# Patient Record
Sex: FEMALE | Race: WHITE | NOT HISPANIC OR LATINO | Employment: FULL TIME | URBAN - METROPOLITAN AREA
[De-identification: names, ages, dates, MRNs, and addresses within clinical notes are randomized per-mention and may not be internally consistent; named-entity substitution may affect disease eponyms.]

---

## 2017-02-09 ENCOUNTER — TELEPHONE (OUTPATIENT)
Dept: OBSTETRICS AND GYNECOLOGY | Facility: CLINIC | Age: 27
End: 2017-02-09

## 2017-02-09 NOTE — TELEPHONE ENCOUNTER
----- Message from Deneen Bowie sent at 2/9/2017  1:52 PM EST -----  Regarding: Etta Grey  Contact: [phone]  Pt was seen by Dr. Tovar, next appt scheduled with Dr. Reveles.   La prescribed her Imiquimod, pt states she is having redness, swelling, burning, itching and slight pain.  She is questioning if those are common side effects, if she should continue medication or discontinue.  I told her I would ask a nurse and follow up with her.      Please AdviseASTRID

## 2017-02-10 ENCOUNTER — TELEPHONE (OUTPATIENT)
Dept: OBSTETRICS AND GYNECOLOGY | Facility: CLINIC | Age: 27
End: 2017-02-10

## 2017-02-10 NOTE — TELEPHONE ENCOUNTER
Note reviewed.      Tissue irritation is very common with aldara.   Stop using product for 5 days then resume treatment as instruction originally.

## 2017-02-10 NOTE — TELEPHONE ENCOUNTER
----- Message from Deneen Bowie sent at 2/9/2017  1:52 PM EST -----  Regarding: Etta Grey  Contact: [phone]  Pt was seen by Dr. Tovar, next appt scheduled with Dr. Reveles.   La prescribed her Imiquimod, pt states she is having redness, swelling, burning, itching and slight pain.  She is questioning if those are common side effects, if she should continue medication or discontinue.  I told her I would ask a nurse and follow up with her.      Please AdviseASTRID

## 2017-10-24 ENCOUNTER — OFFICE VISIT (OUTPATIENT)
Dept: OBSTETRICS AND GYNECOLOGY | Facility: CLINIC | Age: 27
End: 2017-10-24

## 2017-10-24 VITALS
WEIGHT: 121 LBS | HEIGHT: 61 IN | BODY MASS INDEX: 22.84 KG/M2 | DIASTOLIC BLOOD PRESSURE: 70 MMHG | SYSTOLIC BLOOD PRESSURE: 118 MMHG

## 2017-10-24 DIAGNOSIS — R87.613 HGSIL (HIGH GRADE SQUAMOUS INTRAEPITHELIAL LESION) ON PAP SMEAR OF CERVIX: ICD-10-CM

## 2017-10-24 DIAGNOSIS — Z13.89 SCREENING FOR BLOOD OR PROTEIN IN URINE: Primary | ICD-10-CM

## 2017-10-24 DIAGNOSIS — Z11.3 SCREEN FOR STD (SEXUALLY TRANSMITTED DISEASE): ICD-10-CM

## 2017-10-24 DIAGNOSIS — Z13.9 SPECIAL SCREENING: ICD-10-CM

## 2017-10-24 LAB
B-HCG UR QL: NEGATIVE
INTERNAL NEGATIVE CONTROL: NEGATIVE
INTERNAL POSITIVE CONTROL: POSITIVE
Lab: NORMAL

## 2017-10-24 PROCEDURE — 57455 BIOPSY OF CERVIX W/SCOPE: CPT | Performed by: OBSTETRICS & GYNECOLOGY

## 2017-10-24 PROCEDURE — 81025 URINE PREGNANCY TEST: CPT | Performed by: OBSTETRICS & GYNECOLOGY

## 2017-10-24 NOTE — PROGRESS NOTES
[facility]    10/24/2017    Name: Etta Grey    MR#:[id number]      Procedures       Physical Exam    Colposcopy Procedure Note    Indications: Pap smear 10 months ago showed: high-grade squamous intraepithelial neoplasia  (HGSIL-encompassing moderate and severe dysplasia).  The patient had subsequent LEEP procedure that showed focal ESPERANZA-2 and ESPERANZA-3.  Due to the nature of the receipt of specimen clear margins could not be identified.  The patient returns today without having had previous scheduled follow-up        Procedure Details   The risks and benefits of the procedure and Written informed consent obtained.    Speculum placed in vagina and excellent visualization of cervix achieved, cervix swabbed x 3 with acetic acid solution.    Findings:  Cervix: acetowhite lesion(s) noted at 6 o'clock; appears more consistent with metaplasia.  Attempted biopsy and patient unable to tolerate.  Vaginal inspection: vaginal colposcopy not performed.  Vulvar colposcopy: vulvar colposcopy not performed.    Specimens: PAP, fragment biopsy at 6    Complications: none.    Plan:  Will base further treatment on Pathology findings.        10/24/2017  MD Jake Santos MD  10/24/2017  11:34 AM

## 2017-10-26 LAB
C TRACH RRNA SPEC QL NAA+PROBE: NEGATIVE
DX ICD CODE: NORMAL
DX ICD CODE: NORMAL
N GONORRHOEA RRNA SPEC QL NAA+PROBE: NEGATIVE
PATH REPORT.FINAL DX SPEC: NORMAL
PATH REPORT.GROSS SPEC: NORMAL
PATH REPORT.SITE OF ORIGIN SPEC: NORMAL
PATHOLOGIST NAME: NORMAL
PAYMENT PROCEDURE: NORMAL
T VAGINALIS RRNA SPEC QL NAA+PROBE: NEGATIVE

## 2017-10-27 ENCOUNTER — TELEPHONE (OUTPATIENT)
Dept: OBSTETRICS AND GYNECOLOGY | Facility: CLINIC | Age: 27
End: 2017-10-27

## 2017-10-27 LAB
CONV .: NORMAL
CYTOLOGIST CVX/VAG CYTO: NORMAL
CYTOLOGY CVX/VAG DOC THIN PREP: NORMAL
DX ICD CODE: NORMAL
HIV 1 & 2 AB SER-IMP: NORMAL
Lab: NORMAL
OTHER STN SPEC: NORMAL
PATH REPORT.FINAL DX SPEC: NORMAL
STAT OF ADQ CVX/VAG CYTO-IMP: NORMAL

## 2017-10-27 NOTE — TELEPHONE ENCOUNTER
This pt has called several times about her results . She seems like she doesn't understand . She wanted hpv results but looks like it could not be ran on the pap ? Not enough sells . Can you call  Her and ease her mind. [phone] .She has looked at every thing on my chart

## 2017-10-27 NOTE — TELEPHONE ENCOUNTER
----- Message from Jake Reveles MD sent at 10/26/2017  1:38 PM EDT -----  Call patient: Cervical biopsy returned mild dysplasia.  Follow-up 6 months

## 2017-10-28 ENCOUNTER — DOCUMENTATION (OUTPATIENT)
Dept: OBSTETRICS AND GYNECOLOGY | Facility: CLINIC | Age: 27
End: 2017-10-28

## 2017-10-28 PROBLEM — N87.0 DYSPLASIA OF CERVIX, LOW GRADE (CIN 1): Status: ACTIVE | Noted: 2017-10-28

## 2017-10-28 NOTE — TELEPHONE ENCOUNTER
Attempted call on both numbers ending 69 no answer and no VM.    No answer.  VM with no identifier - no message left    Phone number is the message DOES NOT match phone number in the chart.    Clarify please.

## 2017-10-28 NOTE — PROGRESS NOTES
"[facility]     10/28/2017      Patient:  Etta Grey   MR#:[id number]    Chart note    Received message from medical assistant that patient is confused by the results provided.    Patient was seen 10/24/17 for colposcopy.  PAP was done.  A small area on the cervix was visualized with the microscopy around the 6:00 position on the cervix that appears questionable for dysplasia.  That biopsy returned ESPERANZA I (mild dysplasia)    So to explain:  The PAP smear is collecting cells that are being shed by the cervix to look for disease.  Sometimes it is negative when disease is present because the disease is not big enough to shed cells.  The purpose of the colpo (microscope) is to visualize abnormalities and target a directed biopsy.  The biopsy performed shows \"mildly\" abnormal cells.    The patient will need a recheck in 6 months.  Most patients (>85%) will see resolution of mild dysplasia in 1 year with just observation.  Smoking cessation/smoking abstinence helps immensely.     If this does NOT fully explain things, please call Monday.    Jake Reveles MD.  10/28/2017  9:41 AM          "

## 2017-10-30 NOTE — TELEPHONE ENCOUNTER
Pt returned call and I informed her that her pap came back negative so the HPV was not ran. I explained to pt that this was good and that she just needs to come back in 6 months for F/U. Pt stated understanding

## 2018-01-29 ENCOUNTER — OFFICE VISIT (OUTPATIENT)
Dept: OBSTETRICS AND GYNECOLOGY | Age: 28
End: 2018-01-29

## 2018-01-29 VITALS
SYSTOLIC BLOOD PRESSURE: 90 MMHG | WEIGHT: 122 LBS | BODY MASS INDEX: 23.03 KG/M2 | HEIGHT: 61 IN | DIASTOLIC BLOOD PRESSURE: 60 MMHG

## 2018-01-29 DIAGNOSIS — Z13.89 SCREENING FOR BLOOD OR PROTEIN IN URINE: ICD-10-CM

## 2018-01-29 DIAGNOSIS — R30.0 DYSURIA: Primary | ICD-10-CM

## 2018-01-29 DIAGNOSIS — N30.10 INTERSTITIAL CYSTITIS (CHRONIC) WITHOUT HEMATURIA: ICD-10-CM

## 2018-01-29 DIAGNOSIS — Z11.3 SCREENING FOR STD (SEXUALLY TRANSMITTED DISEASE): ICD-10-CM

## 2018-01-29 LAB
BILIRUB BLD-MCNC: NEGATIVE MG/DL
CLARITY, POC: CLEAR
COLOR UR: YELLOW
GLUCOSE UR STRIP-MCNC: NEGATIVE MG/DL
KETONES UR QL: NEGATIVE
LEUKOCYTE EST, POC: NEGATIVE
NITRITE UR-MCNC: NEGATIVE MG/ML
PH UR: 6.5 [PH] (ref 5–8)
PROT UR STRIP-MCNC: NEGATIVE MG/DL
RBC # UR STRIP: NEGATIVE /UL
SP GR UR: 1.01 (ref 1–1.03)
UROBILINOGEN UR QL: NORMAL

## 2018-01-29 PROCEDURE — 99213 OFFICE O/P EST LOW 20 MIN: CPT | Performed by: OBSTETRICS & GYNECOLOGY

## 2018-01-29 PROCEDURE — 81002 URINALYSIS NONAUTO W/O SCOPE: CPT | Performed by: OBSTETRICS & GYNECOLOGY

## 2018-01-29 RX ORDER — ERGOCALCIFEROL 1.25 MG/1
CAPSULE ORAL
Refills: 0 | COMMUNITY
Start: 2018-01-09 | End: 2018-11-29

## 2018-01-29 RX ORDER — LEVOTHYROXINE SODIUM 0.05 MG/1
TABLET ORAL
Refills: 0 | COMMUNITY
Start: 2018-01-09 | End: 2021-08-04

## 2018-01-29 NOTE — PROGRESS NOTES
2018      Patient:  Etta Grey   MR#:[id number]    Office note    CC: urinary discomfort     Subjective     History of Present Illness  27 y.o. female  complaint of ongoing intermittent mild to moderate urinary discomfort perceived mostly as urgency are incomplete voiding.  She states the symptoms she equates to feeling like she has a bladder infection.  Outside of this visit she's had a couple of visits at urgent care facilities and had normal UAs at the time of her symptoms.  The patient states that episodes last several hours and may occur as frequently as 3 times a week are as infrequently as once a month.    We discussed multiple etiologies could be responsible for her symptoms.  Previous screening was essentially within normal limits.      Patient Active Problem List   Diagnosis   • High grade intrepith lesion cyto smr crvx (HGSIL)   • Dysplasia of cervix, low grade (ESPERANZA 1)       Past Medical History:   Diagnosis Date   • Cancer     CERVICAL   • Disease of thyroid gland 2018    hyperthyroid   • HGSIL (high grade squamous intraepithelial lesion) on Pap smear of cervix    • HPV (human papilloma virus) anogenital infection        Past Surgical History:   Procedure Laterality Date   • CERVICAL CONIZATION N/A 12/15/2016    Procedure: COLD KNIFE CERVICAL CONIZATION;  Surgeon: Leslye Tovar MD;  Location: [facility];  Service:    • COLPOSCOPY  2015    High Grade ESPERANZA II       Obstetric History:  OB History      Para Term  AB Living    0 0 0 0 0 0    SAB TAB Ectopic Multiple Live Births    0 0 0 0          Menstrual History:     Patient's last menstrual period was 2018 (lmp unknown).       No OB History data found    Family History   Problem Relation Age of Onset   • Diabetes Maternal Aunt    • Diabetes Maternal Uncle    • Diabetes Paternal Aunt    • Diabetes Paternal Uncle    • Ovarian cancer Maternal Grandmother 50   • Diabetes Maternal Grandfather   "  • Diabetes Paternal Grandfather    • Breast cancer Neg Hx    • Colon cancer Neg Hx    • Melanoma Neg Hx    • Prostate cancer Neg Hx    • Uterine cancer Neg Hx        Social History   Substance Use Topics   • Smoking status: Former Smoker     Packs/day: 0.25     Quit date: 11/6/2016   • Smokeless tobacco: Never Used   • Alcohol use Yes      Comment: 6 BEERS ONCE A MONTH        Review of patient's allergies indicates no known allergies.      Current Outpatient Prescriptions:   •  levothyroxine (SYNTHROID, LEVOTHROID) 50 MCG tablet, take 1 tablet by mouth every morning ON AN EMPTY STOMACH, Disp: , Rfl: 0  •  vitamin D (ERGOCALCIFEROL) 40474 units capsule capsule, take 1 capsule by mouth every week, Disp: , Rfl: 0    The following portions of the patient's history were reviewed and updated as appropriate: allergies, current medications, past family history, past medical history, past social history, past surgical history and problem list.    Review of Systems   Constitutional: Negative.    Respiratory: Negative.    Cardiovascular: Negative.    Gastrointestinal: Negative.    Genitourinary: Positive for dysuria and pelvic pain.   Psychiatric/Behavioral: Negative.        BP Readings from Last 3 Encounters:   01/29/18 90/60   10/24/17 118/70   12/27/16 118/72      Wt Readings from Last 3 Encounters:   01/29/18 55.3 kg (122 lb)   10/24/17 54.9 kg (121 lb)   12/27/16 50.8 kg (112 lb)      BMI: Estimated body mass index is 23.05 kg/(m^2) as calculated from the following:    Height as of this encounter: 154.9 cm (61\").    Weight as of this encounter: 55.3 kg (122 lb).  BSA: Estimated body surface area is 1.53 meters squared as calculated from the following:    Height as of this encounter: 154.9 cm (61\").    Weight as of this encounter: 55.3 kg (122 lb).    Objective   Physical Exam   Constitutional: She is oriented to person, place, and time. She appears well-developed and well-nourished.   HENT:   Head: Normocephalic and " atraumatic.   Cardiovascular: Normal rate.    Pulmonary/Chest: Effort normal.   Abdominal: Soft. Bowel sounds are normal. She exhibits no distension. There is no tenderness.   Genitourinary: Vagina normal and uterus normal.   Neurological: She is alert and oriented to person, place, and time.   Skin: Skin is warm and dry.   Psychiatric: She has a normal mood and affect. Her behavior is normal. Judgment and thought content normal.   Nursing note and vitals reviewed.        Assessment/Plan     Etta was seen today for follow-up.    Diagnoses and all orders for this visit:    Dysuria  -     Urine Culture - Urine, Urine, Clean Catch    Screening for blood or protein in urine  -     POC Urinalysis Dipstick  -     Urine Culture - Urine, Urine, Clean Catch    Screening for STD (sexually transmitted disease)  -     Chlamydia trachomatis, Neisseria gonorrhoeae, Trichomonas vaginalis, PCR - Swab, Vagina  -     Urine Culture - Urine, Urine, Clean Catch    Interstitial cystitis (chronic) without hematuria  -     Urine Culture - Urine, Urine, Clean Catch        -     pentosan polysulfate (ELMIRON) 100 MG capsule; Take 1 capsule by mouth 3 (Three) Times a Day Before Meals.        -     provided internet resources for reading about interstitial cystitis and alternative treatments      No Follow-up on file.        Jake Reveles MD   1/29/2018 1:17 PM

## 2018-01-31 LAB
BACTERIA UR CULT: NORMAL
BACTERIA UR CULT: NORMAL
C TRACH RRNA SPEC QL NAA+PROBE: NEGATIVE
N GONORRHOEA RRNA SPEC QL NAA+PROBE: NEGATIVE
T VAGINALIS RRNA SPEC QL NAA+PROBE: NEGATIVE

## 2018-02-01 ENCOUNTER — PROCEDURE VISIT (OUTPATIENT)
Dept: OBSTETRICS AND GYNECOLOGY | Age: 28
End: 2018-02-01

## 2018-02-01 DIAGNOSIS — R87.613 HIGH GRADE INTREPITH LESION CYTO SMR CRVX (HGSIL): ICD-10-CM

## 2018-02-01 DIAGNOSIS — R10.2 PELVIC PRESSURE IN FEMALE: ICD-10-CM

## 2018-02-01 DIAGNOSIS — N87.0 DYSPLASIA OF CERVIX, LOW GRADE (CIN 1): ICD-10-CM

## 2018-02-01 DIAGNOSIS — N39.0 FREQUENT UTI: ICD-10-CM

## 2018-02-01 PROCEDURE — 76830 TRANSVAGINAL US NON-OB: CPT | Performed by: OBSTETRICS & GYNECOLOGY

## 2018-02-01 NOTE — PROGRESS NOTES
Ultrasound Note     18    Patient:  Etta Grey      MR#:[id number]    27 y.o.   for GYN US    Patient Active Problem List   Diagnosis   • High grade intrepith lesion cyto smr crvx (HGSIL)   • Dysplasia of cervix, low grade (ESPERANZA 1)       [See the scanned report in the media tab for more details]    Impression    1.  Normal size uterus: 5.7 x 2.8 x 3.4  2.  Endometrium: 9.5 normal  3:  Myometrium: Unremarkable  4.  Ovaries Left: Multiple follicles otherwise unremarkable, Right normal  5.  Unremarkable pelvic ultrasound    Relevant comparison data available:  [x]  None          Jake Reveles MD  2018 8:17 AM

## 2018-04-25 ENCOUNTER — TELEPHONE (OUTPATIENT)
Dept: OBSTETRICS AND GYNECOLOGY | Age: 28
End: 2018-04-25

## 2018-04-25 ENCOUNTER — PROCEDURE VISIT (OUTPATIENT)
Dept: OBSTETRICS AND GYNECOLOGY | Age: 28
End: 2018-04-25

## 2018-04-25 VITALS
BODY MASS INDEX: 22.09 KG/M2 | WEIGHT: 117 LBS | SYSTOLIC BLOOD PRESSURE: 100 MMHG | DIASTOLIC BLOOD PRESSURE: 60 MMHG | HEIGHT: 61 IN

## 2018-04-25 DIAGNOSIS — Z13.9 SPECIAL SCREENING: Primary | ICD-10-CM

## 2018-04-25 DIAGNOSIS — N87.0 DYSPLASIA OF CERVIX, LOW GRADE (CIN 1): ICD-10-CM

## 2018-04-25 DIAGNOSIS — R87.613 HIGH GRADE INTREPITH LESION CYTO SMR CRVX (HGSIL): ICD-10-CM

## 2018-04-25 PROBLEM — F17.200 SMOKER UNMOTIVATED TO QUIT: Status: ACTIVE | Noted: 2018-04-25

## 2018-04-25 PROCEDURE — 57455 BIOPSY OF CERVIX W/SCOPE: CPT | Performed by: OBSTETRICS & GYNECOLOGY

## 2018-04-25 PROCEDURE — 81025 URINE PREGNANCY TEST: CPT | Performed by: OBSTETRICS & GYNECOLOGY

## 2018-04-25 NOTE — PROGRESS NOTES
4/25/2018    Patient: Etta Grey          MR#:[id number]        Procedures       Physical Exam   Genitourinary:           Colposcopy Procedure Note    Indications: Pap smear 6 months ago showed: no abnormalities.  History of HGSIL with unclear margins    Procedure Details   The risks and benefits of the procedure and Written informed consent obtained.    Speculum placed in vagina and excellent visualization of cervix achieved, cervix swabbed x 3 with acetic acid solution.    Findings:  Cervix: visible lesion(s) at 0600 o'clock; SCJ visualized 360 degrees with AC at 5-7  Vaginal inspection: vaginal colposcopy not performed.  Vulvar colposcopy: vulvar colposcopy not performed.    Specimens: PAP, Bx at 0600    Complications: none.    The procedure was well tolerated by the patient without problems.    Plan:  Treatment options discussed with patient.    Jake Reveles MD  4/25/2018  12:14 PM

## 2018-04-25 NOTE — TELEPHONE ENCOUNTER
Betty pt asked if Dr. Reveles could send prescription in for Chantix, Turmeric, and Grape Seed Extract.

## 2018-04-26 NOTE — TELEPHONE ENCOUNTER
Notify patient: chantix sent to pharmacy.    Tumeric and grape seed extracts are supplements and OTC.  No RX is needed.

## 2018-04-26 NOTE — TELEPHONE ENCOUNTER
LVM notifying patient that Chantix prescription has been sent and the supplement and OTC does not need prescription.

## 2018-04-30 ENCOUNTER — TELEPHONE (OUTPATIENT)
Dept: OBSTETRICS AND GYNECOLOGY | Age: 28
End: 2018-04-30

## 2018-04-30 LAB
CONV .: NORMAL
CYTOLOGIST CVX/VAG CYTO: NORMAL
CYTOLOGY CVX/VAG DOC THIN PREP: NORMAL
DX ICD CODE: NORMAL
HIV 1 & 2 AB SER-IMP: NORMAL
OTHER STN SPEC: NORMAL
PATH REPORT.FINAL DX SPEC: NORMAL
PATHOLOGIST CVX/VAG CYTO: NORMAL
STAT OF ADQ CVX/VAG CYTO-IMP: NORMAL

## 2018-04-30 NOTE — TELEPHONE ENCOUNTER
----- Message from Jake Reveles MD sent at 4/30/2018  2:55 PM EDT -----  Call pt:  PAP is negative.

## 2018-05-01 PROBLEM — N87.0 DYSPLASIA OF CERVIX, LOW GRADE (CIN 1): Status: ACTIVE | Noted: 2018-05-01

## 2018-05-01 LAB
DX ICD CODE: NORMAL
DX ICD CODE: NORMAL
PATH REPORT.FINAL DX SPEC: NORMAL
PATH REPORT.GROSS SPEC: NORMAL
PATH REPORT.SITE OF ORIGIN SPEC: NORMAL
PATHOLOGIST NAME: NORMAL
PAYMENT PROCEDURE: NORMAL

## 2018-05-03 ENCOUNTER — TELEPHONE (OUTPATIENT)
Dept: OBSTETRICS AND GYNECOLOGY | Age: 28
End: 2018-05-03

## 2018-05-03 NOTE — TELEPHONE ENCOUNTER
----- Message from Jake Reveles MD sent at 5/1/2018  9:16 PM EDT -----  Notify pt: cervical biopsy returns mild dysplasia.  Schedule appt for colpo in 6 month  Strongly support smoking cessation

## 2018-07-02 ENCOUNTER — OFFICE VISIT (OUTPATIENT)
Dept: OBSTETRICS AND GYNECOLOGY | Age: 28
End: 2018-07-02

## 2018-07-02 VITALS
SYSTOLIC BLOOD PRESSURE: 98 MMHG | WEIGHT: 106.6 LBS | BODY MASS INDEX: 20.12 KG/M2 | DIASTOLIC BLOOD PRESSURE: 68 MMHG | HEIGHT: 61 IN

## 2018-07-02 DIAGNOSIS — Z30.013 ENCOUNTER FOR INITIAL PRESCRIPTION OF INJECTABLE CONTRACEPTIVE: Primary | ICD-10-CM

## 2018-07-02 PROCEDURE — 99213 OFFICE O/P EST LOW 20 MIN: CPT | Performed by: NURSE PRACTITIONER

## 2018-07-02 RX ORDER — MEDROXYPROGESTERONE ACETATE 150 MG/ML
150 INJECTION, SUSPENSION INTRAMUSCULAR
Qty: 1 ML | Refills: 2 | Status: SHIPPED | OUTPATIENT
Start: 2018-07-02 | End: 2018-11-29

## 2018-07-02 NOTE — PROGRESS NOTES
[facility]  Routine Annual Visit    2018    Patient: Etta Grey          MR#:[id number]      History of Present Illness    27 y.o. female  who presents for contraception consult. Thea reports she has been on depo in the past and desires to restart. Reports she has not been on depo for at least several years. Counseled regarding possibility of decrease bone density, especially with long-term use, bleeding irregularities, and weight gain. Thea reports unprotected IC less than a week ago. Counseled regarding extreme importance of no chance of pregnancy prior to injection. She will return on menses for insertion and advised no unprotected IC until then.    Patient's last menstrual period was 2018.  Obstetric History:  OB History      Para Term  AB Living    0 0 0 0 0 0    SAB TAB Ectopic Molar Multiple Live Births    0 0 0   0           Menstrual History:     Patient's last menstrual period was 2018.       Sexual History:       ________________________________________  Patient Active Problem List   Diagnosis   • High grade intrepith lesion cyto smr crvx (HGSIL)   • Dysplasia of cervix, low grade (ESPERANZA 1)   • Smoker unmotivated to quit   • Dysplasia of cervix, low grade (ESPERANZA 1)       Past Medical History:   Diagnosis Date   • Cancer (CMS/HCC)     CERVICAL   • Disease of thyroid gland 2018    hyperthyroid   • HGSIL (high grade squamous intraepithelial lesion) on Pap smear of cervix    • HPV (human papilloma virus) anogenital infection        Past Surgical History:   Procedure Laterality Date   • CERVICAL CONIZATION N/A 12/15/2016    Procedure: COLD KNIFE CERVICAL CONIZATION;  Surgeon: Leslye Tovar MD;  Location: [facility];  Service:    • COLPOSCOPY  2015    High Grade ESPERANZA II       History   Smoking Status   • Former Smoker   • Packs/day: 0.25   • Quit date: 2018   Smokeless Tobacco   • Never Used       Family History   Problem  "Relation Age of Onset   • Diabetes Maternal Aunt    • Diabetes Maternal Uncle    • Diabetes Paternal Aunt    • Diabetes Paternal Uncle    • Ovarian cancer Maternal Grandmother 50   • Diabetes Maternal Grandfather    • Diabetes Paternal Grandfather    • Breast cancer Neg Hx    • Colon cancer Neg Hx    • Melanoma Neg Hx    • Prostate cancer Neg Hx    • Uterine cancer Neg Hx        Prior to Admission medications    Medication Sig Start Date End Date Taking? Authorizing Provider   levothyroxine (SYNTHROID, LEVOTHROID) 50 MCG tablet take 1 tablet by mouth every morning ON AN EMPTY STOMACH 1/9/18  Yes Historical Provider, MD   pentosan polysulfate (ELMIRON) 100 MG capsule Take 1 capsule by mouth 3 (Three) Times a Day Before Meals. 1/29/18  Yes Jake Reveles MD   varenicline (CHANTIX STARTING MONTH PAK) 0.5 MG X 11 & 1 MG X 42 tablet Take 0.5 mg one daily on days 1-3 and and 0.5 mg twice daily on days 4-7.Then 1 mg twice daily for a total of 12 weeks. 4/26/18   Jake Reveles MD   vitamin D (ERGOCALCIFEROL) 82663 units capsule capsule take 1 capsule by mouth every week 1/9/18   Historical Provider, MD     ________________________________________      The following portions of the patient's history were reviewed and updated as appropriate: allergies, current medications, past family history, past medical history, past social history, past surgical history and problem list.    Review of Systems   Constitutional: Negative for chills, fatigue and fever.   Gastrointestinal: Negative for abdominal distention and abdominal pain.   Genitourinary: Negative for decreased urine volume, difficulty urinating, dyspareunia, dysuria, frequency, genital sores, hematuria, menstrual problem, pelvic pain, urgency, vaginal bleeding, vaginal discharge and vaginal pain.     Objective   Physical Exam    BP 98/68   Ht 154.9 cm (60.98\")   Wt 48.4 kg (106 lb 9.6 oz)   LMP 06/27/2018   Breastfeeding? No   BMI 20.15 kg/m²    BP Readings from " "Last 3 Encounters:   07/02/18 98/68   04/25/18 100/60   01/29/18 90/60      Wt Readings from Last 3 Encounters:   07/02/18 48.4 kg (106 lb 9.6 oz)   04/25/18 53.1 kg (117 lb)   01/29/18 55.3 kg (122 lb)        BMI: Estimated body mass index is 20.15 kg/m² as calculated from the following:    Height as of this encounter: 154.9 cm (60.98\").    Weight as of this encounter: 48.4 kg (106 lb 9.6 oz).      Consult only today    Assessment:    1. Contraception- all options discussed in detail. umer is only interested in restarting depo. Risks, benefits, possible side effects discussed. She will return on her menses for first injection and advised no unprotected IC until then.     Plan:    [x]  Rx: depo   []  Mammogram request made  []  PAP done  []  Occult fecal blood test (Insure)  []  Labs:   []  GC/Chl/TV  []  DEXA scan   []  Referral for colonoscopy:           ERICKSON Patel  7/2/2018 3:04 PM  "

## 2018-07-17 ENCOUNTER — TELEPHONE (OUTPATIENT)
Dept: OBSTETRICS AND GYNECOLOGY | Age: 28
End: 2018-07-17

## 2018-07-17 NOTE — TELEPHONE ENCOUNTER
Left message for patient to call back.  Received PA request for Elmiron (interstitial cystitis) and not sure that Dr. Reveles prescribed?

## 2018-07-20 NOTE — TELEPHONE ENCOUNTER
Patient stated Dr. Reveles did give this to her and would like PA sent.  Advised will send today.  Taking due to constant UTI symptoms, however, doesn't have a UTI and was told by Dr. Reveles to take elmiron which does seem to help.

## 2018-11-05 ENCOUNTER — TELEPHONE (OUTPATIENT)
Dept: OBSTETRICS AND GYNECOLOGY | Age: 28
End: 2018-11-05

## 2018-11-05 NOTE — TELEPHONE ENCOUNTER
Patient called regarding no show for Colposcopy. Rescheduled her appointment.  Patient  requested another rx for Chantix.  Stated lost her last script.

## 2018-11-08 NOTE — TELEPHONE ENCOUNTER
Left message at [facility] with Chantix Starting Month Yordan and directions.

## 2018-11-08 NOTE — TELEPHONE ENCOUNTER
There is a problem with the assigned pharmacy.    It says the pharmacy is not valid.    Pick another pharmacy or call a verbal order as detailed in her medication list.

## 2018-11-29 ENCOUNTER — OFFICE VISIT (OUTPATIENT)
Dept: OBSTETRICS AND GYNECOLOGY | Age: 28
End: 2018-11-29

## 2018-11-29 VITALS
WEIGHT: 113 LBS | DIASTOLIC BLOOD PRESSURE: 64 MMHG | BODY MASS INDEX: 21.34 KG/M2 | SYSTOLIC BLOOD PRESSURE: 112 MMHG | HEIGHT: 61 IN

## 2018-11-29 DIAGNOSIS — Z13.9 SPECIAL SCREENING: Primary | ICD-10-CM

## 2018-11-29 DIAGNOSIS — R87.613 HIGH GRADE INTREPITH LESION CYTO SMR CRVX (HGSIL): ICD-10-CM

## 2018-11-29 DIAGNOSIS — F17.200 SMOKER UNMOTIVATED TO QUIT: ICD-10-CM

## 2018-11-29 DIAGNOSIS — N87.0 DYSPLASIA OF CERVIX, LOW GRADE (CIN 1): ICD-10-CM

## 2018-11-29 PROCEDURE — 57452 EXAM OF CERVIX W/SCOPE: CPT | Performed by: OBSTETRICS & GYNECOLOGY

## 2018-11-29 PROCEDURE — 81025 URINE PREGNANCY TEST: CPT | Performed by: OBSTETRICS & GYNECOLOGY

## 2018-11-29 NOTE — PROGRESS NOTES
11/29/2018    Patient: Etta Grey          MR#:[id number]    Procedures       Physical Exam   Genitourinary:           Colposcopy Procedure Note    Indications: Pap smear 7 months ago showed: low-grade squamous intraepithelial neoplasia (LGSIL - encompassing HPV,mild dysplasia,ESPERANZA I).     Procedure Details   The risks and benefits of the procedure and Written informed consent obtained.    Speculum placed in vagina and excellent visualization of cervix achieved, cervix swabbed x 3 with acetic acid solution.    Findings:  Cervix: acetowhite lesion(s) noted at 6 o'clock; no biopsies taken appears c/w ESPERANZA biopsied previously .  Vaginal inspection: vaginal colposcopy not performed.  Vulvar colposcopy: vulvar colposcopy not performed.    Specimens: PAP only. Foci noted biopsied previously - appears smaller     Complications: none.    The procedure was well tolerated by the patient without problems.    Plan:  Will base further treatment on Pathology findings.          Jake Reveles MD  11/29/2018  11:55 AM

## 2018-12-06 ENCOUNTER — TELEPHONE (OUTPATIENT)
Dept: OBSTETRICS AND GYNECOLOGY | Age: 28
End: 2018-12-06

## 2018-12-06 LAB
CONV .: ABNORMAL
CYTOLOGIST CVX/VAG CYTO: ABNORMAL
CYTOLOGY CVX/VAG DOC THIN PREP: ABNORMAL
DX ICD CODE: ABNORMAL
DX ICD CODE: ABNORMAL
HIV 1 & 2 AB SER-IMP: ABNORMAL
HPV I/H RISK 4 DNA CVX QL PROBE+SIG AMP: NEGATIVE
OTHER STN SPEC: ABNORMAL
PATH REPORT.FINAL DX SPEC: ABNORMAL
PATHOLOGIST CVX/VAG CYTO: ABNORMAL
STAT OF ADQ CVX/VAG CYTO-IMP: ABNORMAL

## 2018-12-06 NOTE — TELEPHONE ENCOUNTER
----- Message from Jake Reveles MD sent at 12/6/2018  9:40 AM EST -----  Call patient: Pap shows atypical cells of undetermined significance with hybrid capture negative    No treatment is needed at this time.    Patient is advised to follow up annually for PAP

## 2019-03-07 RX ORDER — VARENICLINE TARTRATE 1 MG/1
1 TABLET, FILM COATED ORAL DAILY
Qty: 30 TABLET | Refills: 3 | Status: SHIPPED | OUTPATIENT
Start: 2019-03-07 | End: 2020-01-08

## 2019-03-07 NOTE — TELEPHONE ENCOUNTER
Pt needs a refill on the 2nd pack of chantex. She is finished with the starting pack. [email or web address] in [place]

## 2019-06-17 ENCOUNTER — TELEPHONE (OUTPATIENT)
Dept: OBSTETRICS AND GYNECOLOGY | Age: 29
End: 2019-06-17

## 2019-07-01 ENCOUNTER — PROCEDURE VISIT (OUTPATIENT)
Dept: OBSTETRICS AND GYNECOLOGY | Age: 29
End: 2019-07-01

## 2019-07-01 VITALS
SYSTOLIC BLOOD PRESSURE: 104 MMHG | HEIGHT: 61 IN | WEIGHT: 110 LBS | BODY MASS INDEX: 20.77 KG/M2 | DIASTOLIC BLOOD PRESSURE: 58 MMHG

## 2019-07-01 DIAGNOSIS — R87.610 PAP SMEAR ABNORMALITY OF CERVIX WITH ASCUS FAVORING BENIGN: Primary | ICD-10-CM

## 2019-07-01 DIAGNOSIS — F17.200 SMOKER UNMOTIVATED TO QUIT: ICD-10-CM

## 2019-07-01 DIAGNOSIS — R87.613 HIGH GRADE INTREPITH LESION CYTO SMR CRVX (HGSIL): ICD-10-CM

## 2019-07-01 DIAGNOSIS — N87.0 DYSPLASIA OF CERVIX, LOW GRADE (CIN 1): ICD-10-CM

## 2019-07-01 DIAGNOSIS — Z13.9 SPECIAL SCREENING: ICD-10-CM

## 2019-07-01 PROCEDURE — 57452 EXAM OF CERVIX W/SCOPE: CPT | Performed by: OBSTETRICS & GYNECOLOGY

## 2019-07-01 NOTE — PROGRESS NOTES
7/1/2019    Patient: Etta Grey          MR#:[id number]    Procedures       Physical Exam    Colposcopy Procedure Note    Indications: Pap smear 6 months ago showed: ASCUS with NEGATIVE high risk HPV.  Patient has a remote history of HGSIL    Procedure Details   The risks and benefits of the procedure and Written informed consent obtained.    Speculum placed in vagina and excellent visualization of cervix achieved, cervix swabbed x 3 with acetic acid solution.    Findings:  Cervix: no visible lesions; SCJ visualized 360 degrees without lesions.  Vaginal inspection: vaginal colposcopy not performed.  Vulvar colposcopy: vulvar colposcopy not performed.    Specimens: PAP only    Complications: none.    The procedure was well tolerated by the patient without problems.    Plan:  Will base further treatment on Pathology findings.          Jake Reveles MD  7/1/2019  2:26 PM

## 2019-07-05 ENCOUNTER — TELEPHONE (OUTPATIENT)
Dept: OBSTETRICS AND GYNECOLOGY | Age: 29
End: 2019-07-05

## 2019-07-05 LAB
CYTOLOGIST CVX/VAG CYTO: ABNORMAL
CYTOLOGY CVX/VAG DOC CYTO: ABNORMAL
CYTOLOGY CVX/VAG DOC THIN PREP: ABNORMAL
DX ICD CODE: ABNORMAL
DX ICD CODE: ABNORMAL
HIV 1 & 2 AB SER-IMP: ABNORMAL
HPV I/H RISK 4 DNA CVX QL PROBE+SIG AMP: POSITIVE
OTHER STN SPEC: ABNORMAL
PATHOLOGIST CVX/VAG CYTO: ABNORMAL
STAT OF ADQ CVX/VAG CYTO-IMP: ABNORMAL

## 2019-07-05 NOTE — TELEPHONE ENCOUNTER
----- Message from Jake Reveles MD sent at 7/5/2019  3:29 PM EDT -----  Call pt:   PAP is abnormal:  Atypical cells with +HPV  Needs colpo in 6 months, please schedule

## 2020-01-08 ENCOUNTER — TELEPHONE (OUTPATIENT)
Dept: OBSTETRICS AND GYNECOLOGY | Age: 30
End: 2020-01-08

## 2020-01-08 NOTE — TELEPHONE ENCOUNTER
Patient called and stated she has previously taken Chantix and is wanting another prescription of Chantix called into her pharmacy.  Please advise.

## 2020-02-05 ENCOUNTER — PROCEDURE VISIT (OUTPATIENT)
Dept: OBSTETRICS AND GYNECOLOGY | Age: 30
End: 2020-02-05

## 2020-02-05 VITALS
DIASTOLIC BLOOD PRESSURE: 70 MMHG | BODY MASS INDEX: 21.3 KG/M2 | WEIGHT: 112.8 LBS | SYSTOLIC BLOOD PRESSURE: 110 MMHG | HEIGHT: 61 IN

## 2020-02-05 DIAGNOSIS — Z12.4 SCREENING FOR MALIGNANT NEOPLASM OF CERVIX: ICD-10-CM

## 2020-02-05 DIAGNOSIS — N30.10 INTERSTITIAL CYSTITIS: ICD-10-CM

## 2020-02-05 DIAGNOSIS — N87.0 DYSPLASIA OF CERVIX, LOW GRADE (CIN 1): ICD-10-CM

## 2020-02-05 DIAGNOSIS — Z11.3 SCREENING FOR STD (SEXUALLY TRANSMITTED DISEASE): ICD-10-CM

## 2020-02-05 DIAGNOSIS — E03.9 HYPOTHYROIDISM (ACQUIRED): Primary | ICD-10-CM

## 2020-02-05 PROCEDURE — 99213 OFFICE O/P EST LOW 20 MIN: CPT | Performed by: OBSTETRICS & GYNECOLOGY

## 2020-02-05 PROCEDURE — 57452 EXAM OF CERVIX W/SCOPE: CPT | Performed by: OBSTETRICS & GYNECOLOGY

## 2020-02-05 NOTE — PROGRESS NOTES
2020      Patient:  Etta Grey   MR#:[id number]    Office note    Chief Complaint   Patient presents with   • Colposcopy     last pap 19(ASC-US) HPV (+)        Subjective     History of Present Illness  29 y.o. female  the patient presents for scheduled colposcopy visit with a history of lower abdominal pelvic pain attributed to moderate interstitial cystitis.  Previously she was prescribed Elmiron with good control of her symptoms.  The patient stopped her    Elmiron and thyroid replacement for hypothyroidism and saw natural CBD oil.  She reports initially she felt like the CBD oil improved her symptoms and she felt fine but the CBD oil is cost prohibitive and she is not sure it is as effective as it was originally.      Patient Active Problem List   Diagnosis   • High grade intrepith lesion cyto smr crvx (HGSIL)   • Smoker unmotivated to quit   • Dysplasia of cervix, low grade (ESPERANZA 1)   • Hypothyroidism (acquired)   • Interstitial cystitis       Past Medical History:   Diagnosis Date   • Disease of thyroid gland 2018    hyperthyroid   • HGSIL (high grade squamous intraepithelial lesion) on Pap smear of cervix    • HPV (human papilloma virus) anogenital infection        Past Surgical History:   Procedure Laterality Date   • CERVICAL CONIZATION N/A 12/15/2016    Procedure: COLD KNIFE CERVICAL CONIZATION;  Surgeon: Leslye Tovar MD;  Location: [facility];  Service:    • COLPOSCOPY  2015    High Grade ESPERANZA II       Obstetric History:  OB History        0    Para   0    Term   0       0    AB   0    Living   0       SAB   0    TAB   0    Ectopic   0    Molar        Multiple   0    Live Births                   Menstrual History:     Patient's last menstrual period was 2020 (exact date).       No OB History data found    Family History   Problem Relation Age of Onset   • Diabetes Maternal Aunt    • Diabetes Maternal Uncle    • Diabetes Paternal Aunt     • Diabetes Paternal Uncle    • Ovarian cancer Maternal Grandmother 50   • Diabetes Maternal Grandfather    • Diabetes Paternal Grandfather    • Breast cancer Neg Hx    • Colon cancer Neg Hx    • Melanoma Neg Hx    • Prostate cancer Neg Hx    • Uterine cancer Neg Hx        Social History     Tobacco Use   • Smoking status: Current Every Day Smoker     Packs/day: 0.50     Last attempt to quit: 2018     Years since quittin.7   • Smokeless tobacco: Never Used   • Tobacco comment: <0.50 pack daily.    Substance Use Topics   • Alcohol use: Yes     Comment: 6 BEERS ONCE A MONTH    • Drug use: No       Patient has no known allergies.      Current Outpatient Medications:   •  varenicline (CHANTIX STARTING MONTH ) 0.5 MG X 11 & 1 MG X 42 tablet, Take 0.5 mg one daily on days 1-3 and and 0.5 mg twice daily on days 4-7.Then 1 mg twice daily for a total of 12 weeks., Disp: 53 tablet, Rfl: 0  •  CBD (cannabidiol) oral oil, Take  by mouth As Needed., Disp: , Rfl:   •  levothyroxine (SYNTHROID, LEVOTHROID) 50 MCG tablet, take 1 tablet by mouth every morning ON AN EMPTY STOMACH, Disp: , Rfl: 0  •  pentosan polysulfate (ELMIRON) 100 MG capsule, Take 1 capsule by mouth 3 (Three) Times a Day Before Meals., Disp: 90 capsule, Rfl: 06    The following portions of the patient's history were reviewed and updated as appropriate: allergies, current medications, past family history, past medical history, past social history, past surgical history and problem list.    Review of Systems   Constitutional: Positive for fatigue.   Respiratory: Negative.    Cardiovascular: Negative.    Gastrointestinal: Negative.    Genitourinary: Positive for pelvic pain.   Psychiatric/Behavioral: Negative.        BP Readings from Last 3 Encounters:   20 110/70   19 104/58   18 112/64      Wt Readings from Last 3 Encounters:   20 51.2 kg (112 lb 12.8 oz)   19 49.9 kg (110 lb)   18 51.3 kg (113 lb)      BMI:  "Estimated body mass index is 21.31 kg/m² as calculated from the following:    Height as of this encounter: 154.9 cm (61\").    Weight as of this encounter: 51.2 kg (112 lb 12.8 oz).  BSA: Estimated body surface area is 1.48 meters squared as calculated from the following:    Height as of this encounter: 154.9 cm (61\").    Weight as of this encounter: 51.2 kg (112 lb 12.8 oz).    Objective   Physical Exam   Constitutional: She is oriented to person, place, and time. She appears well-developed and well-nourished.   HENT:   Head: Normocephalic and atraumatic.   Cardiovascular: Normal rate.   Pulmonary/Chest: Effort normal.   Abdominal: Soft. Bowel sounds are normal. She exhibits no distension. There is no tenderness.   Genitourinary: Vagina normal and uterus normal.   Neurological: She is alert and oriented to person, place, and time.   Skin: Skin is warm and dry.   Psychiatric: She has a normal mood and affect. Her behavior is normal. Judgment and thought content normal.   Nursing note and vitals reviewed.        Assessment/Plan     Etta was seen today for colposcopy.    Diagnoses and all orders for this visit:    Hypothyroidism (acquired)  -     TSH  -     T4, free    Dysplasia of cervix, low grade (ESPERANZA 1)    Interstitial cystitis  -     pentosan polysulfate (ELMIRON) 100 MG capsule; Take 1 capsule by mouth 3 (Three) Times a Day Before Meals.    Screening for STD (sexually transmitted disease)  -     Chlamydia trachomatis, Neisseria gonorrhoeae, Trichomonas vaginalis, PCR - Swab, Cervix          Return in about 6 months (around 8/5/2020) for Colposcopy.        Jake Reveles MD   2/5/2020 1:43 PM  "

## 2020-02-06 ENCOUNTER — TELEPHONE (OUTPATIENT)
Dept: OBSTETRICS AND GYNECOLOGY | Age: 30
End: 2020-02-06

## 2020-02-06 LAB
T4 FREE SERPL-MCNC: 1.82 NG/DL (ref 0.82–1.77)
TSH SERPL DL<=0.005 MIU/L-ACNC: 1.77 UIU/ML (ref 0.45–4.5)

## 2020-02-06 NOTE — TELEPHONE ENCOUNTER
----- Message from Jake Reveles MD sent at 2/6/2020 11:27 AM EST -----  Notify patient:  Thyroid testing is normal.  No replacement is indicated.    Suggest repeat testing in 3 months

## 2020-02-06 NOTE — TELEPHONE ENCOUNTER
Patient advised.  Still having all the symptoms of thyroid disorder, foggy brain etc. Advised to check with her PCP regarding additional testing. Also, mentioned the Elmiron not covered.  Needs a prior authorization.  Advised I have the form and will contact insurance in the morning to process.

## 2020-02-06 NOTE — PROGRESS NOTES
Notify patient:  Thyroid testing is normal.  No replacement is indicated.    Suggest repeat testing in 3 months

## 2020-02-07 NOTE — TELEPHONE ENCOUNTER
Spoke with insurance company to process prior authorization for patient's Elmiron.  Using code N30.10, interstitial cystitis, PA was approved.  Conf# [id number], approval from 2/7/2020 to 2/6/2021.   Left message to advise patient.

## 2020-02-10 LAB
CYTOLOGIST CVX/VAG CYTO: ABNORMAL
CYTOLOGY CVX/VAG DOC CYTO: ABNORMAL
CYTOLOGY CVX/VAG DOC THIN PREP: ABNORMAL
DX ICD CODE: ABNORMAL
DX ICD CODE: ABNORMAL
HIV 1 & 2 AB SER-IMP: ABNORMAL
HPV I/H RISK 4 DNA CVX QL PROBE+SIG AMP: NEGATIVE
OTHER STN SPEC: ABNORMAL
PATHOLOGIST CVX/VAG CYTO: ABNORMAL
RECOM F/U CVX/VAG CYTO: ABNORMAL
STAT OF ADQ CVX/VAG CYTO-IMP: ABNORMAL

## 2020-02-11 ENCOUNTER — TELEPHONE (OUTPATIENT)
Dept: OBSTETRICS AND GYNECOLOGY | Age: 30
End: 2020-02-11

## 2020-02-11 NOTE — TELEPHONE ENCOUNTER
"----- Message from Jake Reveles MD sent at 2/10/2020  5:02 PM EST -----  Notify pt:  PAP returned with LGSIL  \"mildly abnormal\" cells    Recommend colposcopy with repeat PAP in 6 months  "

## 2020-02-12 ENCOUNTER — TELEPHONE (OUTPATIENT)
Dept: OBSTETRICS AND GYNECOLOGY | Age: 30
End: 2020-02-12

## 2020-02-12 PROBLEM — A74.9 CHLAMYDIA: Status: ACTIVE | Noted: 2020-02-12

## 2020-02-12 LAB
A VAGINAE DNA VAG QL NAA+PROBE: ABNORMAL SCORE
BVAB2 DNA VAG QL NAA+PROBE: ABNORMAL SCORE
C ALBICANS DNA VAG QL NAA+PROBE: NEGATIVE
C GLABRATA DNA VAG QL NAA+PROBE: NEGATIVE
C TRACH RRNA SPEC QL NAA+PROBE: POSITIVE
MEGA1 DNA VAG QL NAA+PROBE: ABNORMAL SCORE
N GONORRHOEA RRNA SPEC QL NAA+PROBE: NEGATIVE
T VAGINALIS RRNA SPEC QL NAA+PROBE: NEGATIVE

## 2020-02-12 NOTE — TELEPHONE ENCOUNTER
Tried to call patient to schedule consultation appointment with Shira Givens.  She did not answer and message left.

## 2020-02-12 NOTE — PROGRESS NOTES
Call patient:  Notify + Chl  Schedule consult with Shira for treatment and offer treatment to partner  Treat patient with rocephin as well     Schedule test of cure 6 weeks

## 2020-02-13 ENCOUNTER — CLINICAL SUPPORT (OUTPATIENT)
Dept: OBSTETRICS AND GYNECOLOGY | Age: 30
End: 2020-02-13

## 2020-02-13 DIAGNOSIS — A56.2 ACUTE GENITOURINARY CHLAMYDIA TRACHOMATIS INFECTION: Primary | ICD-10-CM

## 2020-02-13 PROCEDURE — 96372 THER/PROPH/DIAG INJ SC/IM: CPT | Performed by: NURSE PRACTITIONER

## 2020-02-13 RX ORDER — AZITHROMYCIN 500 MG/1
1000 TABLET, FILM COATED ORAL ONCE
Qty: 2 TABLET | Refills: 0 | Status: SHIPPED | OUTPATIENT
Start: 2020-02-13 | End: 2020-02-13

## 2020-02-20 ENCOUNTER — OFFICE VISIT (OUTPATIENT)
Dept: OBSTETRICS AND GYNECOLOGY | Age: 30
End: 2020-02-20

## 2020-02-20 VITALS
DIASTOLIC BLOOD PRESSURE: 70 MMHG | SYSTOLIC BLOOD PRESSURE: 110 MMHG | BODY MASS INDEX: 22.28 KG/M2 | WEIGHT: 118 LBS | HEIGHT: 61 IN

## 2020-02-20 DIAGNOSIS — Z11.3 SCREENING EXAMINATION FOR SEXUALLY TRANSMITTED DISEASE: Primary | ICD-10-CM

## 2020-02-20 PROCEDURE — 99213 OFFICE O/P EST LOW 20 MIN: CPT | Performed by: NURSE PRACTITIONER

## 2020-02-20 NOTE — PROGRESS NOTES
Subjective   Etta Grey is a 29 y.o. female.     History of Present Illness     Thea presents for follow up regarding + chlamydia diagnosed 2/5/2020.     She was treated with rocephin in office and reports compliance with zithromax therapy.  She is not currently sexually active so not concerned regarding re-exposure.  Her questions were answered. Safe sex counseling provided. Offered serum screening today for HIV, hepatitis, RPR and Etta is agreeable.    She voices no complaints today    The following portions of the patient's history were reviewed and updated as appropriate: allergies, current medications, past family history, past medical history, past social history, past surgical history and problem list.    Review of Systems   Constitutional: Negative for chills, fatigue and fever.   Gastrointestinal: Negative for abdominal distention and abdominal pain.   Genitourinary: Negative for decreased urine volume, difficulty urinating, dyspareunia, dysuria, frequency, genital sores, hematuria, menstrual problem, pelvic pain, pelvic pressure, urgency, urinary incontinence, vaginal bleeding, vaginal discharge and vaginal pain.       Objective   Physical Exam   Constitutional: She is oriented to person, place, and time. She appears well-developed and well-nourished. No distress.   Neurological: She is alert and oriented to person, place, and time.   Skin: Skin is warm and dry.   Psychiatric: She has a normal mood and affect. Her behavior is normal.       Assessment/Plan   Etta was seen today for follow-up.    Diagnoses and all orders for this visit:    Screening examination for sexually transmitted disease  -     Hepatitis B Surface Antigen  -     Hepatitis C Antibody  -     RPR  -     HIV-1 / O / 2 Ag / Antibody 4th Generation      Return for AVRIL in 4 weeks.    Shira Givens, ERICKSON

## 2020-02-21 ENCOUNTER — TELEPHONE (OUTPATIENT)
Dept: OBSTETRICS AND GYNECOLOGY | Age: 30
End: 2020-02-21

## 2020-02-21 LAB
HBV SURFACE AG SERPL QL IA: NEGATIVE
HCV AB S/CO SERPL IA: <0.1 S/CO RATIO (ref 0–0.9)
HIV 1+2 AB+HIV1 P24 AG SERPL QL IA: NON REACTIVE
RPR SER QL: NON REACTIVE

## 2020-02-21 NOTE — TELEPHONE ENCOUNTER
----- Message from ERICKSON King sent at 2/21/2020  9:47 AM EST -----  Please notify blood work from yesterday returned all negative.

## 2020-04-01 ENCOUNTER — OFFICE VISIT (OUTPATIENT)
Dept: OBSTETRICS AND GYNECOLOGY | Age: 30
End: 2020-04-01

## 2020-04-01 ENCOUNTER — TELEPHONE (OUTPATIENT)
Dept: OBSTETRICS AND GYNECOLOGY | Age: 30
End: 2020-04-01

## 2020-04-01 VITALS
HEIGHT: 61 IN | SYSTOLIC BLOOD PRESSURE: 124 MMHG | DIASTOLIC BLOOD PRESSURE: 78 MMHG | WEIGHT: 114 LBS | BODY MASS INDEX: 21.52 KG/M2

## 2020-04-01 DIAGNOSIS — A74.9 CHLAMYDIA: ICD-10-CM

## 2020-04-01 DIAGNOSIS — N30.10 INTERSTITIAL CYSTITIS: Primary | ICD-10-CM

## 2020-04-01 LAB
BILIRUB BLD-MCNC: NEGATIVE MG/DL
CLARITY, POC: CLEAR
COLOR UR: YELLOW
GLUCOSE UR STRIP-MCNC: NEGATIVE MG/DL
KETONES UR QL: NEGATIVE
LEUKOCYTE EST, POC: ABNORMAL
NITRITE UR-MCNC: NEGATIVE MG/ML
PH UR: 6.5 [PH] (ref 5–8)
PROT UR STRIP-MCNC: NEGATIVE MG/DL
RBC # UR STRIP: NEGATIVE /UL
SP GR UR: 1.02 (ref 1–1.03)
UROBILINOGEN UR QL: NORMAL

## 2020-04-01 PROCEDURE — 99213 OFFICE O/P EST LOW 20 MIN: CPT | Performed by: NURSE PRACTITIONER

## 2020-04-01 PROCEDURE — 81002 URINALYSIS NONAUTO W/O SCOPE: CPT | Performed by: NURSE PRACTITIONER

## 2020-04-01 NOTE — TELEPHONE ENCOUNTER
Patient called and is having some symptoms of urinary tract infection.  She was scheduled for test of cure 03/25 and got cancelled.  I advised patient to be seen at urgent care.  Please advise when to make her appt.

## 2020-04-01 NOTE — PROGRESS NOTES
Subjective   Etta Grey is a 29 y.o. female.     History of Present Illness     Etta presents with complaint of ongoing intermittent urinary urgency. She has discussed her symptoms with Dr. Reveles in the past and was told symptoms likely due to interstitial cystitis as urine cultures return negative.  She was prescribed elmiron but states has not taken due to the strict three times per day regimen.   Denies dysuria, abdominal/pelvic pain, fever/chills  UA negative- culture sent  Recently treated for chlamydia- there is no concern for reexposure and she reports compliance with the medication therapy- AVRIL today.    The following portions of the patient's history were reviewed and updated as appropriate: allergies, current medications, past family history, past medical history, past social history, past surgical history and problem list.    Review of Systems   Constitutional: Negative for chills, fatigue and fever.   Gastrointestinal: Negative for abdominal distention and abdominal pain.   Genitourinary: Positive for pelvic pressure and urgency. Negative for decreased urine volume, dyspareunia, dysuria, flank pain, frequency, genital sores, hematuria, menstrual problem, pelvic pain, urinary incontinence, vaginal bleeding, vaginal discharge and vaginal pain.       Objective   Physical Exam   Constitutional: She is oriented to person, place, and time. She appears well-developed and well-nourished. No distress.   Genitourinary: Uterus normal and cervix normal. There is no rash, tenderness, lesion, injury or Bartholin's cyst on the right labia. There is no rash, tenderness, lesion, injury or Bartholin's cyst on the left labia. Right adnexum displays no mass, no tenderness and no fullness. Left adnexum displays no mass, no tenderness and no fullness.   Neurological: She is alert and oriented to person, place, and time.   Skin: Skin is warm and dry.   Psychiatric: She has a normal mood and affect. Her behavior is  normal.       Assessment/Plan   Etta was seen today for urinary tract infection.    Diagnoses and all orders for this visit:    Interstitial cystitis  -     POC Urinalysis Dipstick  -     Urine Culture - Urine, Urine, Clean Catch    Chlamydia  -     Chlamydia trachomatis, Neisseria gonorrhoeae, Trichomonas vaginalis, PCR - Swab, Vagina      UA and nuswab sent to rule out infection. Symptoms likely due to chronic IC. Long discussion regarding interstitial cystitis. Discussed possible triggers and lifestyle modifications. Recommend begin daily antihistamine. Recommend 2 days or pyridium while awaiting labs. Then would recommend begin elmiron as recommended by Dr. Reveles. Also discussed seeing specialist and rescource was given. She is happy with this plan.    ERICKSON Patel

## 2020-04-02 LAB
BACTERIA UR CULT: NORMAL
BACTERIA UR CULT: NORMAL
C TRACH RRNA SPEC QL NAA+PROBE: NEGATIVE
N GONORRHOEA RRNA SPEC QL NAA+PROBE: NEGATIVE
T VAGINALIS DNA SPEC QL NAA+PROBE: NEGATIVE

## 2020-04-03 ENCOUNTER — TELEPHONE (OUTPATIENT)
Dept: OBSTETRICS AND GYNECOLOGY | Age: 30
End: 2020-04-03

## 2020-04-03 NOTE — TELEPHONE ENCOUNTER
----- Message from ERICKSON King sent at 4/3/2020  8:45 AM EDT -----  Please notify patient her STI screening returned negative and urine culture negative.

## 2020-04-03 NOTE — TELEPHONE ENCOUNTER
Left voicemail informing lab results returned negative. Encouraged return call with any questions

## 2020-06-16 NOTE — PROGRESS NOTES
Call patient: Cervical biopsy returned mild dysplasia.  Follow-up 6 months Anemia    Anxiety    Arthritis    Asthma    Benign Essential Tremor    Bipolar 1 disorder    Colitis  H/O  Degenerative disc disease, lumbar    Depression    ETOH Abuse  H/O  Hypertension    Kidney stone    Morbid Obesity    Neuropathy    Renal Calculi  chronic    Sleep Apnea    Sleep Apnea    Spinal stenosis

## 2020-08-05 ENCOUNTER — TELEPHONE (OUTPATIENT)
Dept: OBSTETRICS AND GYNECOLOGY | Age: 30
End: 2020-08-05

## 2020-08-25 ENCOUNTER — TELEPHONE (OUTPATIENT)
Dept: OBSTETRICS AND GYNECOLOGY | Age: 30
End: 2020-08-25

## 2021-01-28 ENCOUNTER — OFFICE VISIT (OUTPATIENT)
Dept: OBSTETRICS AND GYNECOLOGY | Age: 31
End: 2021-01-28

## 2021-01-28 VITALS
SYSTOLIC BLOOD PRESSURE: 120 MMHG | HEIGHT: 61 IN | WEIGHT: 130.8 LBS | DIASTOLIC BLOOD PRESSURE: 78 MMHG | BODY MASS INDEX: 24.7 KG/M2

## 2021-01-28 DIAGNOSIS — N89.8 VAGINAL ITCHING: ICD-10-CM

## 2021-01-28 DIAGNOSIS — Z01.419 WELL WOMAN EXAM WITH ROUTINE GYNECOLOGICAL EXAM: Primary | ICD-10-CM

## 2021-01-28 PROBLEM — R87.613 HIGH GRADE INTREPITH LESION CYTO SMR CRVX (HGSIL): Status: RESOLVED | Noted: 2017-10-24 | Resolved: 2021-01-28

## 2021-01-28 PROBLEM — A74.9 CHLAMYDIA: Status: RESOLVED | Noted: 2020-02-12 | Resolved: 2021-01-28

## 2021-01-28 PROCEDURE — 99213 OFFICE O/P EST LOW 20 MIN: CPT | Performed by: NURSE PRACTITIONER

## 2021-01-28 PROCEDURE — 99395 PREV VISIT EST AGE 18-39: CPT | Performed by: NURSE PRACTITIONER

## 2021-01-28 RX ORDER — NYSTATIN AND TRIAMCINOLONE ACETONIDE 100000; 1 [USP'U]/G; MG/G
OINTMENT TOPICAL 2 TIMES DAILY
Qty: 30 G | Refills: 0 | Status: SHIPPED | OUTPATIENT
Start: 2021-01-28 | End: 2021-02-02

## 2021-01-31 LAB
A VAGINAE DNA VAG QL NAA+PROBE: NORMAL SCORE
BVAB2 DNA VAG QL NAA+PROBE: NORMAL SCORE
C ALBICANS DNA VAG QL NAA+PROBE: NEGATIVE
C GLABRATA DNA VAG QL NAA+PROBE: NEGATIVE
C TRACH DNA VAG QL NAA+PROBE: NEGATIVE
MEGA1 DNA VAG QL NAA+PROBE: NORMAL SCORE
N GONORRHOEA DNA VAG QL NAA+PROBE: NEGATIVE
T VAGINALIS DNA VAG QL NAA+PROBE: NEGATIVE

## 2021-02-01 LAB
CYTOLOGIST CVX/VAG CYTO: ABNORMAL
CYTOLOGY CVX/VAG DOC CYTO: ABNORMAL
CYTOLOGY CVX/VAG DOC THIN PREP: ABNORMAL
DX ICD CODE: ABNORMAL
HIV 1 & 2 AB SER-IMP: ABNORMAL
HPV I/H RISK 4 DNA CVX QL PROBE+SIG AMP: POSITIVE
OTHER STN SPEC: ABNORMAL
STAT OF ADQ CVX/VAG CYTO-IMP: ABNORMAL

## 2021-02-08 ENCOUNTER — TELEPHONE (OUTPATIENT)
Dept: OBSTETRICS AND GYNECOLOGY | Age: 31
End: 2021-02-08

## 2021-02-08 NOTE — TELEPHONE ENCOUNTER
Patient informed of negative nuswab and + HPV results. With her hx abnormals, I recommend she return for possible colpo with Dr. Reveles. She is agreeable.

## 2021-04-16 ENCOUNTER — BULK ORDERING (OUTPATIENT)
Dept: CASE MANAGEMENT | Facility: OTHER | Age: 31
End: 2021-04-16

## 2021-04-16 DIAGNOSIS — Z23 IMMUNIZATION DUE: ICD-10-CM

## 2021-04-16 NOTE — PROGRESS NOTES
2/5/2020    Patient: Etta Grey          MR#:[id number]    Procedures       Physical Exam   Genitourinary:           Colposcopy Procedure Note      Chief Complaint   Patient presents with   • Colposcopy     last pap 7/1/19(ASC-US) HPV (+)        Indications: Pap smear 7 months ago showed: ASCUS with POSITIVE high risk HPV.     Procedure Details   The risks and benefits of the procedure and Written informed consent obtained.    Speculum placed in vagina and excellent visualization of cervix achieved, cervix swabbed x 3 with acetic acid solution.    Findings:  Cervix: no visible lesions; SCJ visualized 360 degrees without lesions.  Mild cervicitis of canal   Vaginal inspection: vaginal colposcopy not performed.  Vulvar colposcopy: vulvar colposcopy not performed.    Specimens: PAP     Complications: none.    The procedure was well tolerated by the patient without problems.    Plan:  Will base further treatment on Pathology findings.          Jake Reveles MD  2/5/2020  1:35 PM                         ---

## 2021-07-28 ENCOUNTER — TELEPHONE (OUTPATIENT)
Dept: OBSTETRICS AND GYNECOLOGY | Age: 31
End: 2021-07-28

## 2021-07-28 NOTE — TELEPHONE ENCOUNTER
I tried to call patient about rescheduling her appointment that she no showed for today for colposcopy.  No answer/no voicemail available.

## 2021-08-03 ENCOUNTER — TELEPHONE (OUTPATIENT)
Dept: OBSTETRICS AND GYNECOLOGY | Age: 31
End: 2021-08-03

## 2021-08-03 NOTE — TELEPHONE ENCOUNTER
LM for pt to call back     She does not need colpo tomorrow (8/4/21) okay to cancel appt if she is not having any vulvar itching anymore or doesn't feel she needs to be seen.

## 2021-08-04 ENCOUNTER — PROCEDURE VISIT (OUTPATIENT)
Dept: OBSTETRICS AND GYNECOLOGY | Age: 31
End: 2021-08-04

## 2021-08-04 VITALS
SYSTOLIC BLOOD PRESSURE: 142 MMHG | DIASTOLIC BLOOD PRESSURE: 80 MMHG | WEIGHT: 131 LBS | BODY MASS INDEX: 24.73 KG/M2 | HEIGHT: 61 IN

## 2021-08-04 DIAGNOSIS — R03.0 ELEVATED BP WITHOUT DIAGNOSIS OF HYPERTENSION: ICD-10-CM

## 2021-08-04 DIAGNOSIS — F17.200 SMOKER UNMOTIVATED TO QUIT: ICD-10-CM

## 2021-08-04 DIAGNOSIS — N30.10 INTERSTITIAL CYSTITIS: ICD-10-CM

## 2021-08-04 DIAGNOSIS — Z13.89 SCREENING FOR BLOOD OR PROTEIN IN URINE: ICD-10-CM

## 2021-08-04 DIAGNOSIS — B37.31 VULVOVAGINITIS CANDIDA ALBICANS: Primary | ICD-10-CM

## 2021-08-04 LAB
BILIRUB BLD-MCNC: NEGATIVE MG/DL
CLARITY, POC: CLEAR
COLOR UR: YELLOW
GLUCOSE UR STRIP-MCNC: NEGATIVE MG/DL
KETONES UR QL: NEGATIVE
LEUKOCYTE EST, POC: NEGATIVE
NITRITE UR-MCNC: NEGATIVE MG/ML
PH UR: 7 [PH] (ref 5–8)
PROT UR STRIP-MCNC: NEGATIVE MG/DL
RBC # UR STRIP: NEGATIVE /UL
SP GR UR: 1.02 (ref 1–1.03)
UROBILINOGEN UR QL: NORMAL

## 2021-08-04 PROCEDURE — 99213 OFFICE O/P EST LOW 20 MIN: CPT | Performed by: OBSTETRICS & GYNECOLOGY

## 2021-08-04 PROCEDURE — 81002 URINALYSIS NONAUTO W/O SCOPE: CPT | Performed by: OBSTETRICS & GYNECOLOGY

## 2021-08-04 RX ORDER — PANTOPRAZOLE SODIUM 40 MG/1
TABLET, DELAYED RELEASE ORAL
COMMUNITY
Start: 2021-08-03 | End: 2022-04-12

## 2022-03-21 RX ORDER — PANTOPRAZOLE SODIUM 40 MG/1
TABLET, DELAYED RELEASE ORAL
Qty: 30 TABLET | OUTPATIENT
Start: 2022-03-21

## 2022-04-04 NOTE — TELEPHONE ENCOUNTER
----- Message from Deneen Bowie sent at 2/9/2017  1:52 PM EST -----  Regarding: Etta Grey  Contact: [phone]  Pt was seen by Dr. Tovar, next appt scheduled with Dr. Reveles.   La prescribed her Imiquimod, pt states she is having redness, swelling, burning, itching and slight pain.  She is questioning if those are common side effects, if she should continue medication or discontinue.  I told her I would ask a nurse and follow up with her.      Please AdviseASTRID    Surgeon: Janey Dickens MD

## 2022-04-12 RX ORDER — PANTOPRAZOLE SODIUM 40 MG/1
TABLET, DELAYED RELEASE ORAL
Qty: 30 TABLET | Refills: 0 | Status: SHIPPED | OUTPATIENT
Start: 2022-04-12 | End: 2022-05-20

## 2022-04-28 ENCOUNTER — OFFICE VISIT (OUTPATIENT)
Dept: OBSTETRICS AND GYNECOLOGY | Age: 32
End: 2022-04-28

## 2022-04-28 VITALS
HEIGHT: 61 IN | SYSTOLIC BLOOD PRESSURE: 110 MMHG | DIASTOLIC BLOOD PRESSURE: 62 MMHG | WEIGHT: 129 LBS | BODY MASS INDEX: 24.35 KG/M2

## 2022-04-28 DIAGNOSIS — B37.31 YEAST VAGINITIS: Primary | ICD-10-CM

## 2022-04-28 PROCEDURE — 99213 OFFICE O/P EST LOW 20 MIN: CPT | Performed by: NURSE PRACTITIONER

## 2022-04-28 RX ORDER — CITALOPRAM 10 MG/1
10 TABLET ORAL DAILY
COMMUNITY
Start: 2022-04-11 | End: 2022-05-20

## 2022-04-28 RX ORDER — FLUCONAZOLE 150 MG/1
150 TABLET ORAL ONCE
Qty: 1 TABLET | Refills: 0 | Status: SHIPPED | OUTPATIENT
Start: 2022-04-28 | End: 2022-04-28

## 2022-04-28 RX ORDER — FAMOTIDINE 20 MG/1
20 TABLET, FILM COATED ORAL 2 TIMES DAILY
COMMUNITY
Start: 2022-04-11 | End: 2022-06-20

## 2022-04-29 LAB — HBA1C MFR BLD: 5.2 % (ref 4.8–5.6)

## 2022-05-02 NOTE — PROGRESS NOTES
Please notify patient her vaginal swab returned negative for infection and her blood work for diabetes returned normal.

## 2022-05-20 RX ORDER — CITALOPRAM 10 MG/1
TABLET ORAL
Qty: 30 TABLET | Refills: 0 | Status: SHIPPED | OUTPATIENT
Start: 2022-05-20 | End: 2022-06-20

## 2022-05-20 RX ORDER — PANTOPRAZOLE SODIUM 40 MG/1
TABLET, DELAYED RELEASE ORAL
Qty: 30 TABLET | Refills: 0 | Status: SHIPPED | OUTPATIENT
Start: 2022-05-20 | End: 2022-06-20

## 2022-06-16 ENCOUNTER — OFFICE VISIT (OUTPATIENT)
Dept: OBSTETRICS AND GYNECOLOGY | Age: 32
End: 2022-06-16

## 2022-06-16 VITALS
SYSTOLIC BLOOD PRESSURE: 108 MMHG | WEIGHT: 129.8 LBS | BODY MASS INDEX: 24.51 KG/M2 | DIASTOLIC BLOOD PRESSURE: 66 MMHG | HEIGHT: 61 IN

## 2022-06-16 DIAGNOSIS — Z01.419 WELL WOMAN EXAM WITH ROUTINE GYNECOLOGICAL EXAM: Primary | ICD-10-CM

## 2022-06-16 PROCEDURE — 99395 PREV VISIT EST AGE 18-39: CPT | Performed by: NURSE PRACTITIONER

## 2022-06-16 RX ORDER — FLUCONAZOLE 150 MG/1
150 TABLET ORAL ONCE
Qty: 1 TABLET | Refills: 2 | Status: SHIPPED | OUTPATIENT
Start: 2022-06-16 | End: 2022-06-16

## 2022-06-16 NOTE — PROGRESS NOTES
[facility]  Routine Annual Visit    2022    Patient: Etta Grey          MR#:[id number]      History of Present Illness    31 y.o. female  who presents for annual exam.    Reports normal, monthly periods. Doing better with preventing yeast. Strategies reinforced. Requests diflucan prn. No other complaints today.    Patient's last menstrual period was 2022 (approximate).  Obstetric History:  OB History        0    Para   0    Term   0       0    AB   0    Living   0       SAB   0    IAB   0    Ectopic   0    Molar        Multiple   0    Live Births                   Menstrual History:     Patient's last menstrual period was 2022 (approximate).       Sexual History:       ________________________________________  Patient Active Problem List   Diagnosis   • Smoker unmotivated to quit   • Dysplasia of cervix, low grade (ESPERANZA 1)   • Hypothyroidism (acquired)   • Interstitial cystitis       Past Medical History:   Diagnosis Date   • Chlamydia 2020   • Disease of thyroid gland 2018    hyperthyroid   • HGSIL (high grade squamous intraepithelial lesion) on Pap smear of cervix    • High grade intrepith lesion cyto smr crvx (HGSIL) 10/24/2017    2016 LEEP biopsy with indeterminate margins    • HPV (human papilloma virus) anogenital infection        Past Surgical History:   Procedure Laterality Date   • CERVICAL CONIZATION N/A 12/15/2016    Procedure: COLD KNIFE CERVICAL CONIZATION;  Surgeon: Leslye Tovar MD;  Location: [facility];  Service:    • COLPOSCOPY  2015    High Grade ESPERANZA II       Social History     Tobacco Use   Smoking Status Current Some Day Smoker   • Packs/day: 0.50   • Last attempt to quit: 2018   • Years since quittin.0   Smokeless Tobacco Never Used   Tobacco Comment    <0.50 pack daily.        Family History   Problem Relation Age of Onset   • Diabetes Maternal Aunt    • Diabetes Maternal Uncle    • Diabetes  Paternal Aunt    • Diabetes Paternal Uncle    • Ovarian cancer Maternal Grandmother 50   • Diabetes Maternal Grandfather    • Diabetes Paternal Grandfather    • Breast cancer Neg Hx    • Colon cancer Neg Hx    • Melanoma Neg Hx    • Prostate cancer Neg Hx    • Uterine cancer Neg Hx        Prior to Admission medications    Medication Sig Start Date End Date Taking? Authorizing Provider   citalopram (CeleXA) 10 MG tablet TAKE 1 TABLET BY MOUTH EVERY DAY 5/20/22  Yes Ashley Cruz DO   famotidine (PEPCID) 20 MG tablet Take 20 mg by mouth 2 (Two) Times a Day. 4/11/22  Yes Provider, MD Leela   pantoprazole (PROTONIX) 40 MG EC tablet TAKE 1 TABLET BY MOUTH EVERY DAY 5/20/22  Yes Ashley Cruz DO     ________________________________________  The following portions of the patient's history were reviewed and updated as appropriate: allergies, current medications, past family history, past medical history, past social history, past surgical history and problem list.    Review of Systems   Constitutional: Negative.    HENT: Negative.    Eyes: Negative for visual disturbance.   Respiratory: Negative for cough, shortness of breath and wheezing.    Cardiovascular: Negative for chest pain, palpitations and leg swelling.   Gastrointestinal: Negative for abdominal distention, abdominal pain, blood in stool, constipation, diarrhea, nausea and vomiting.   Endocrine: Negative for cold intolerance and heat intolerance.   Genitourinary: Negative for difficulty urinating, dyspareunia, dysuria, frequency, genital sores, hematuria, menstrual problem, pelvic pain, urgency, vaginal bleeding, vaginal discharge and vaginal pain.   Musculoskeletal: Negative.    Skin: Negative.    Neurological: Negative for dizziness, weakness, light-headedness, numbness and headaches.   Hematological: Negative.    Psychiatric/Behavioral: Negative.    Breasts: negative for lumps skin changes, dimpling, swelling, nipple changes/discharge  "bilaterally       Objective   Physical Exam    /66   Ht 154.9 cm (61\")   Wt 58.9 kg (129 lb 12.8 oz)   LMP 05/26/2022 (Approximate)   Breastfeeding No   BMI 24.53 kg/m²    BP Readings from Last 3 Encounters:   06/16/22 108/66   04/28/22 110/62   08/04/21 142/80      Wt Readings from Last 3 Encounters:   06/16/22 58.9 kg (129 lb 12.8 oz)   04/28/22 58.5 kg (129 lb)   08/04/21 59.4 kg (131 lb)        BMI: Estimated body mass index is 24.53 kg/m² as calculated from the following:    Height as of this encounter: 154.9 cm (61\").    Weight as of this encounter: 58.9 kg (129 lb 12.8 oz).            General:   alert, appears stated age and cooperative   Heart: regular rate and rhythm, S1, S2 normal, no murmur, click, rub or gallop   Lungs: clear to auscultation bilaterally   Abdomen: soft, non-tender, without masses or organomegaly   Breast: inspection negative, no nipple discharge or bleeding, no masses or nodularity palpable   Vulva: External genitalia including bartholin's glands, Urethra, Villard's gland and urethra meatus are normal, Perineum, rectum and anus appear normal  and Bladder appears normal without significant prolapse    Vagina: normal mucosa, normal discharge   Cervix: no cervical motion tenderness and no lesions   Uterus: normal size, mobile, non-tender and normal shape and consistency   Adnexa: no mass, fullness, tenderness     Assessment:    Diagnoses and all orders for this visit:    1. Well woman exam with routine gynecological exam (Primary)  -     IgP, Aptima HPV    Other orders  -     fluconazole (DIFLUCAN) 150 MG tablet; Take 1 tablet by mouth 1 (One) Time for 1 dose. Take 1 tablet now.  Dispense: 1 tablet; Refill: 2        Healthy lifestyle modifications discussed, counseled on self breast exams and bone health    All of the patient's questions were addressed and answered, I have encouraged her to call for today's test results if she has not received them within 10 days.  Patient is " advised to call with any change in her condition or with any other questions, otherwise return in 12 months for annual examination.      Shira Givens, ERICKSON  6/16/2022 14:52 EDT

## 2022-06-20 LAB
CYTOLOGIST CVX/VAG CYTO: NORMAL
CYTOLOGY CVX/VAG DOC CYTO: NORMAL
CYTOLOGY CVX/VAG DOC THIN PREP: NORMAL
DX ICD CODE: NORMAL
HIV 1 & 2 AB SER-IMP: NORMAL
HPV I/H RISK 4 DNA CVX QL PROBE+SIG AMP: NEGATIVE
OTHER STN SPEC: NORMAL
STAT OF ADQ CVX/VAG CYTO-IMP: NORMAL

## 2022-06-20 RX ORDER — FAMOTIDINE 20 MG/1
TABLET, FILM COATED ORAL
Qty: 60 TABLET | Refills: 0 | Status: SHIPPED | OUTPATIENT
Start: 2022-06-20 | End: 2022-07-20

## 2022-06-20 RX ORDER — CITALOPRAM 10 MG/1
TABLET ORAL
Qty: 30 TABLET | Refills: 0 | Status: SHIPPED | OUTPATIENT
Start: 2022-06-20 | End: 2022-07-20

## 2022-06-20 RX ORDER — PANTOPRAZOLE SODIUM 40 MG/1
TABLET, DELAYED RELEASE ORAL
Qty: 30 TABLET | Refills: 0 | Status: SHIPPED | OUTPATIENT
Start: 2022-06-20 | End: 2022-07-20

## 2022-07-20 RX ORDER — PANTOPRAZOLE SODIUM 40 MG/1
TABLET, DELAYED RELEASE ORAL
Qty: 30 TABLET | Refills: 0 | Status: SHIPPED | OUTPATIENT
Start: 2022-07-20 | End: 2022-08-19

## 2022-07-20 RX ORDER — FAMOTIDINE 20 MG/1
TABLET, FILM COATED ORAL
Qty: 60 TABLET | Refills: 0 | Status: SHIPPED | OUTPATIENT
Start: 2022-07-20 | End: 2022-08-19

## 2022-07-20 RX ORDER — CITALOPRAM 10 MG/1
TABLET ORAL
Qty: 30 TABLET | Refills: 0 | Status: SHIPPED | OUTPATIENT
Start: 2022-07-20 | End: 2022-08-19

## 2022-08-19 RX ORDER — FAMOTIDINE 20 MG/1
TABLET, FILM COATED ORAL
Qty: 60 TABLET | Refills: 0 | Status: SHIPPED | OUTPATIENT
Start: 2022-08-19 | End: 2022-09-19

## 2022-08-19 RX ORDER — CITALOPRAM 10 MG/1
TABLET ORAL
Qty: 30 TABLET | Refills: 0 | Status: SHIPPED | OUTPATIENT
Start: 2022-08-19 | End: 2022-09-19

## 2022-08-19 RX ORDER — PANTOPRAZOLE SODIUM 40 MG/1
TABLET, DELAYED RELEASE ORAL
Qty: 30 TABLET | Refills: 0 | Status: SHIPPED | OUTPATIENT
Start: 2022-08-19 | End: 2022-09-19

## 2022-08-19 NOTE — TELEPHONE ENCOUNTER
Patient was suppose to follow up in 6 weeks after her appt in Feb. Will send 30 days and schedule appt.

## 2022-09-19 RX ORDER — PANTOPRAZOLE SODIUM 40 MG/1
TABLET, DELAYED RELEASE ORAL
Qty: 30 TABLET | Refills: 0 | Status: SHIPPED | OUTPATIENT
Start: 2022-09-19 | End: 2022-12-16

## 2022-09-19 RX ORDER — FAMOTIDINE 20 MG/1
TABLET, FILM COATED ORAL
Qty: 60 TABLET | Refills: 0 | Status: SHIPPED | OUTPATIENT
Start: 2022-09-19 | End: 2022-12-16

## 2022-09-19 RX ORDER — CITALOPRAM 10 MG/1
TABLET ORAL
Qty: 30 TABLET | Refills: 0 | Status: SHIPPED | OUTPATIENT
Start: 2022-09-19 | End: 2022-11-04

## 2022-11-04 ENCOUNTER — TELEMEDICINE (OUTPATIENT)
Dept: FAMILY MEDICINE CLINIC | Facility: CLINIC | Age: 32
End: 2022-11-04

## 2022-11-04 DIAGNOSIS — N92.0 MENORRHAGIA WITH REGULAR CYCLE: Primary | ICD-10-CM

## 2022-11-04 DIAGNOSIS — F41.9 ANXIETY AND DEPRESSION: ICD-10-CM

## 2022-11-04 DIAGNOSIS — F32.A ANXIETY AND DEPRESSION: ICD-10-CM

## 2022-11-04 PROCEDURE — 99213 OFFICE O/P EST LOW 20 MIN: CPT | Performed by: STUDENT IN AN ORGANIZED HEALTH CARE EDUCATION/TRAINING PROGRAM

## 2022-11-04 RX ORDER — LEVONORGESTREL AND ETHINYL ESTRADIOL 0.1-0.02MG
KIT ORAL
Qty: 84 TABLET | Refills: 3 | Status: SHIPPED | OUTPATIENT
Start: 2022-11-04 | End: 2023-02-06

## 2022-11-04 RX ORDER — CITALOPRAM 20 MG/1
20 TABLET ORAL DAILY
Qty: 90 TABLET | Refills: 0 | Status: SHIPPED | OUTPATIENT
Start: 2022-11-04 | End: 2023-01-13

## 2022-11-04 NOTE — PROGRESS NOTES
Chief Complaint  No chief complaint on file.    Felix Grey presents to [facility]  History of Present Illness    Patient states that she has been doing ok since her last visit. She states that she has had improvement in her depression and anxiety symptoms since her last visit. She states that she continues to have irritable and feels like her anxiety is causing her to be more irritable.     She states that she also is having worsening mood swings with her period. She states that they are heavier and she feels like her moods are worse and she is having trouble with severe cramping. She is not wanting to get pregnant at this time. She is sexually active, but her partner has had a vasectomy.     Objective   Vital Signs:   There were no vitals taken for this visit.    There is no height or weight on file to calculate BMI.    Review of Systems    Past History:  Medical History: has a past medical history of Chlamydia (2/12/2020), Disease of thyroid gland (01/2018), HGSIL (high grade squamous intraepithelial lesion) on Pap smear of cervix (2014), High grade intrepith lesion cyto smr crvx (HGSIL) (10/24/2017), and HPV (human papilloma virus) anogenital infection (2015).   Surgical History: has a past surgical history that includes Colposcopy (01/28/2015) and Cervical Corpectomy (N/A, 12/15/2016).   Family History: family history includes Diabetes in her maternal aunt, maternal grandfather, maternal uncle, paternal aunt, paternal grandfather, and paternal uncle; Ovarian cancer (age of onset: 50) in her maternal grandmother.   Social History: reports that she has been smoking. She has been smoking an average of .5 packs per day. She has never used smokeless tobacco. She reports current alcohol use. She reports that she does not use drugs.      Current Outpatient Medications:   •  citalopram (CeleXA) 20 MG tablet, Take 1 tablet by mouth Daily., Disp: 90 tablet, Rfl: 0  •   famotidine (PEPCID) 20 MG tablet, TAKE 1 TABLET BY MOUTH TWICE DAILY, Disp: 60 tablet, Rfl: 0  •  levonorgestrel-ethinyl estradiol (AVIANE,ALESSE,LESSINA) 0.1-20 MG-MCG per tablet, Take first 21 tabltst from one pack then skip last 7 pills and start a new pack., Disp: 84 tablet, Rfl: 3  •  pantoprazole (PROTONIX) 40 MG EC tablet, TAKE 1 TABLET BY MOUTH EVERY DAY, Disp: 30 tablet, Rfl: 0    Allergies: Patient has no known allergies.    Physical Exam  Constitutional:       Comments: Limited 2/2 Virtual visit.    Pulmonary:      Comments: Speaking in complete sentences. No audible wheezing  Neurological:      Mental Status: She is alert and oriented to person, place, and time.   Psychiatric:         Mood and Affect: Mood normal.         Thought Content: Thought content normal.          Result Review :                   Assessment and Plan    Diagnoses and all orders for this visit:    1. Menorrhagia with regular cycle (Primary)    2. Anxiety and depression    Other orders  -     citalopram (CeleXA) 20 MG tablet; Take 1 tablet by mouth Daily.  Dispense: 90 tablet; Refill: 0  -     levonorgestrel-ethinyl estradiol (AVIANE,ALESSE,LESSINA) 0.1-20 MG-MCG per tablet; Take first 21 tabltst from one pack then skip last 7 pills and start a new pack.  Dispense: 84 tablet; Refill: 3    Will increase patient's Celexa from 10 mg to 20 mg.  Discussed side effect profile and advised if she has any side effects she should contact the office and we will discuss decreasing the medication back or changing to a different medication.    We will also do addition of OCP as she has severe periods and is not desiring pregnancy.  Discussed side effect profile and advised to call if she has any issues.  Follow-up in 3 months or sooner with new or worsening symptoms.    Discussed limitations to virtual visit and patient was agreeable to continue. Discussed that because of the limitations of minimal physical exam that if there is interval  worsening they should present to the office for either curbside visit, or to the emergency department for further evaluation and definitive management. Patient was agreeable to this.    Mode of Visit: Video  Location of patient: home  Location of provider: home  You have chosen to receive care through a telehealth visit.  Does the patient consent to use a video/audio connection for your medical care today? Yes  The visit included audio and video interaction. No technical issues occurred during this visit.     I spent 22 minutes caring for Etta on this date of service. This time includes time spent by me in the following activities:preparing for the visit, performing a medically appropriate examination and/or evaluation , counseling and educating the patient/family/caregiver, ordering medications, tests, or procedures and documenting information in the medical record  Follow Up   No follow-ups on file.  Patient was given instructions and counseling regarding her condition or for health maintenance advice. Please see specific information pulled into the AVS if appropriate.     Ashley Cruz, DO

## 2022-12-16 RX ORDER — FAMOTIDINE 20 MG/1
TABLET, FILM COATED ORAL
Qty: 60 TABLET | Refills: 0 | Status: SHIPPED | OUTPATIENT
Start: 2022-12-16 | End: 2023-02-27

## 2022-12-16 RX ORDER — PANTOPRAZOLE SODIUM 40 MG/1
TABLET, DELAYED RELEASE ORAL
Qty: 30 TABLET | Refills: 0 | Status: SHIPPED | OUTPATIENT
Start: 2022-12-16 | End: 2023-02-13 | Stop reason: SDUPTHER

## 2022-12-16 RX ORDER — CITALOPRAM 10 MG/1
TABLET ORAL
Qty: 30 TABLET | Refills: 0 | OUTPATIENT
Start: 2022-12-16

## 2023-01-13 RX ORDER — CITALOPRAM 20 MG/1
20 TABLET ORAL DAILY
Qty: 90 TABLET | Refills: 0 | Status: SHIPPED | OUTPATIENT
Start: 2023-01-13

## 2023-02-06 ENCOUNTER — TELEMEDICINE (OUTPATIENT)
Dept: FAMILY MEDICINE CLINIC | Facility: CLINIC | Age: 33
End: 2023-02-06
Payer: COMMERCIAL

## 2023-02-06 DIAGNOSIS — F32.A ANXIETY AND DEPRESSION: Primary | ICD-10-CM

## 2023-02-06 DIAGNOSIS — F41.9 ANXIETY AND DEPRESSION: Primary | ICD-10-CM

## 2023-02-06 PROCEDURE — 99213 OFFICE O/P EST LOW 20 MIN: CPT | Performed by: STUDENT IN AN ORGANIZED HEALTH CARE EDUCATION/TRAINING PROGRAM

## 2023-02-06 NOTE — PROGRESS NOTES
"Chief Complaint  No chief complaint on file.    Subjective          Etta Grey presents to [facility]  History of Present Illness    Patient states that she continues to have trouble with her mood and she states that she has been having continued mood lability. She states that she has been more emotional and she has noticed that she has worsening anxiety and she feels like she she has has not been able to \"control\". She has not been taking the birth control that was started last visit.     Objective   Vital Signs:   There were no vitals taken for this visit.    There is no height or weight on file to calculate BMI.    Review of Systems    Past History:  Medical History: has a past medical history of Chlamydia (2/12/2020), Disease of thyroid gland (01/2018), HGSIL (high grade squamous intraepithelial lesion) on Pap smear of cervix (2014), High grade intrepith lesion cyto smr crvx (HGSIL) (10/24/2017), and HPV (human papilloma virus) anogenital infection (2015).   Surgical History: has a past surgical history that includes Colposcopy (01/28/2015) and Cervical Corpectomy (N/A, 12/15/2016).   Family History: family history includes Diabetes in her maternal aunt, maternal grandfather, maternal uncle, paternal aunt, paternal grandfather, and paternal uncle; Ovarian cancer (age of onset: 50) in her maternal grandmother.   Social History: reports that she has been smoking. She has been smoking an average of .5 packs per day. She has never used smokeless tobacco. She reports current alcohol use. She reports that she does not use drugs.      Current Outpatient Medications:   •  Cariprazine HCl (Vraylar) 1.5 MG capsule capsule, Take 1 capsule by mouth Daily., Disp: 30 capsule, Rfl: 1  •  citalopram (CeleXA) 20 MG tablet, TAKE 1 TABLET BY MOUTH DAILY, Disp: 90 tablet, Rfl: 0  •  famotidine (PEPCID) 20 MG tablet, TAKE 1 TABLET BY MOUTH TWICE DAILY, Disp: 60 tablet, Rfl: 0  •  pantoprazole " (PROTONIX) 40 MG EC tablet, TAKE 1 TABLET BY MOUTH EVERY DAY, Disp: 30 tablet, Rfl: 0    Allergies: Patient has no known allergies.    Physical Exam  Constitutional:       Comments: Limited 2/2 Virtual visit.    Pulmonary:      Comments: Speaking in complete sentences. No audible wheezing  Neurological:      Mental Status: She is alert and oriented to person, place, and time.   Psychiatric:         Mood and Affect: Mood normal.         Thought Content: Thought content normal.          Result Review :                   Assessment and Plan    Diagnoses and all orders for this visit:    1. Anxiety and depression (Primary)    Other orders  -     Cariprazine HCl (Vraylar) 1.5 MG capsule capsule; Take 1 capsule by mouth Daily.  Dispense: 30 capsule; Refill: 1    Patient has been having worsening trouble with mood stabilization.  Discussed several options including BuSpar for more anxiety related symptoms as well as Vraylar for more mood stabilization.  Patient states she feels like the mood stabilization would be more beneficial.  We will do Vraylar 1.5 mg.  Samples put to the  for patient to  and will send to pharmacy for prior authorization this likely will be needed.  Follow-up in about 1 month we will discuss titration or change medication at that time.  Call sooner with any worsening symptoms.    Discussed limitations to virtual visit and patient was agreeable to continue. Discussed that because of the limitations of minimal physical exam that if there is interval worsening they should present to the office for either curbside visit, or to the emergency department for further evaluation and definitive management. Patient was agreeable to this.    Mode of Visit: Video  Location of patient: home  Location of provider: [facility] [facility]  You have chosen to receive care through a telehealth visit.  Does the patient consent to use a video/audio connection for your medical care today? Yes  The visit included  audio and video interaction. No technical issues occurred during this visit.     I spent 22 minutes caring for Etta on this date of service. This time includes time spent by me in the following activities:preparing for the visit, performing a medically appropriate examination and/or evaluation , counseling and educating the patient/family/caregiver, ordering medications, tests, or procedures and documenting information in the medical record  Follow Up   No follow-ups on file.  Patient was given instructions and counseling regarding her condition or for health maintenance advice. Please see specific information pulled into the AVS if appropriate.     Ashley Cruz, DO

## 2023-02-14 RX ORDER — PANTOPRAZOLE SODIUM 40 MG/1
40 TABLET, DELAYED RELEASE ORAL DAILY
Qty: 30 TABLET | Refills: 0 | Status: SHIPPED | OUTPATIENT
Start: 2023-02-14

## 2023-02-27 RX ORDER — FAMOTIDINE 20 MG/1
TABLET, FILM COATED ORAL
Qty: 60 TABLET | Refills: 0 | Status: SHIPPED | OUTPATIENT
Start: 2023-02-27

## 2023-04-28 RX ORDER — CITALOPRAM 20 MG/1
20 TABLET ORAL DAILY
Qty: 90 TABLET | Refills: 0 | Status: SHIPPED | OUTPATIENT
Start: 2023-04-28